# Patient Record
Sex: MALE | Race: WHITE
[De-identification: names, ages, dates, MRNs, and addresses within clinical notes are randomized per-mention and may not be internally consistent; named-entity substitution may affect disease eponyms.]

---

## 2020-11-04 ENCOUNTER — HOSPITAL ENCOUNTER (EMERGENCY)
Dept: HOSPITAL 60 - LB.ED | Age: 65
Discharge: HOME | End: 2020-11-04
Payer: MEDICARE

## 2020-11-04 DIAGNOSIS — Z20.828: ICD-10-CM

## 2020-11-04 DIAGNOSIS — B34.9: Primary | ICD-10-CM

## 2020-11-04 PROCEDURE — U0002 COVID-19 LAB TEST NON-CDC: HCPCS

## 2020-11-04 PROCEDURE — 99283 EMERGENCY DEPT VISIT LOW MDM: CPT

## 2020-11-04 NOTE — ER
HISTORY OF PRESENT ILLNESS:  A 65-year-old male who comes in initially to be tested for

COVID.  He states he was exposed to COVID last week on Thursday and Friday and now, he has

been having some symptoms of coughing and not feeling well for the last 2 days.  The patient

states that he does feel better today.  He denies any problems with shortness of breath,

wheezing, or fever.  The patient states he otherwise is healthy and tells me he is not on

any medications.

 

INITIAL TREATMENT:  A COVID test was done initially with negative results.  At this point,

we moved the patient from his car to the ER.

 

PHYSICAL EXAMINATION:

VITAL SIGNS:  Taken and showing he is afebrile.  Blood pressure 143/76, O2 sats 98% on room

air.  HEENT:  Oral mucous membranes are moist.  Tonsils not enlarged or injected.  Pharynx

not inflamed.  NECK:  Supple.  LUNGS:  Clear with good air exchange throughout the lung

fields.  SKIN:  Warm and dry.

 

At this point, the patient states he feels fine.  He is not having any symptoms.  He will be

discharged.

 

DIAGNOSIS:  Viral illness, resolving in nature with a negative coronavirus disease test.

Activity should be as tolerated.  Followup is p.r.nMaciej

 

 

CRS/MODL

DD:  11/04/2020 12:40:40

DT:  11/04/2020 12:52:51

Job #:  [id number]

## 2022-08-11 ENCOUNTER — HOSPITAL ENCOUNTER (EMERGENCY)
Dept: HOSPITAL 60 - LB.ED | Age: 67
Discharge: HOME | End: 2022-08-11
Payer: MEDICARE

## 2022-08-11 DIAGNOSIS — Z20.822: ICD-10-CM

## 2022-08-11 DIAGNOSIS — Z79.84: ICD-10-CM

## 2022-08-11 DIAGNOSIS — Z79.899: ICD-10-CM

## 2022-08-11 DIAGNOSIS — T50.905A: ICD-10-CM

## 2022-08-11 DIAGNOSIS — E86.0: Primary | ICD-10-CM

## 2022-08-11 DIAGNOSIS — Z88.8: ICD-10-CM

## 2022-08-11 DIAGNOSIS — Z79.82: ICD-10-CM

## 2022-08-11 LAB
TRANS CELLS URNS QL MICRO: (no result) /HPF
TROPONIN I SERPL HS-MCNC: 8.9 PG/ML (ref ?–60.4)

## 2022-08-11 PROCEDURE — U0002 COVID-19 LAB TEST NON-CDC: HCPCS
